# Patient Record
Sex: FEMALE | Race: WHITE | NOT HISPANIC OR LATINO | Employment: OTHER | URBAN - METROPOLITAN AREA
[De-identification: names, ages, dates, MRNs, and addresses within clinical notes are randomized per-mention and may not be internally consistent; named-entity substitution may affect disease eponyms.]

---

## 2020-06-04 ENCOUNTER — OFFICE VISIT (OUTPATIENT)
Dept: GERIATRICS | Age: 80
End: 2020-06-04
Payer: COMMERCIAL

## 2020-06-04 VITALS
OXYGEN SATURATION: 98 % | BODY MASS INDEX: 31.84 KG/M2 | TEMPERATURE: 96.9 F | HEART RATE: 60 BPM | WEIGHT: 162.2 LBS | HEIGHT: 60 IN | DIASTOLIC BLOOD PRESSURE: 72 MMHG | SYSTOLIC BLOOD PRESSURE: 160 MMHG

## 2020-06-04 DIAGNOSIS — F03.90 DEMENTIA WITHOUT BEHAVIORAL DISTURBANCE, UNSPECIFIED DEMENTIA TYPE (HCC): ICD-10-CM

## 2020-06-04 DIAGNOSIS — R35.0 FREQUENT URINATION: ICD-10-CM

## 2020-06-04 DIAGNOSIS — R32 URINARY INCONTINENCE, UNSPECIFIED TYPE: Primary | ICD-10-CM

## 2020-06-04 DIAGNOSIS — R35.0 FREQUENCY OF URINATION: ICD-10-CM

## 2020-06-04 LAB
BACTERIA UR QL AUTO: ABNORMAL /HPF
BILIRUB UR QL STRIP: ABNORMAL
CLARITY UR: CLEAR
COLOR UR: YELLOW
GLUCOSE UR STRIP-MCNC: NEGATIVE MG/DL
HGB UR QL STRIP.AUTO: NEGATIVE
KETONES UR STRIP-MCNC: NEGATIVE MG/DL
LEUKOCYTE ESTERASE UR QL STRIP: ABNORMAL
MUCOUS THREADS UR QL AUTO: ABNORMAL
NITRITE UR QL STRIP: NEGATIVE
NON-SQ EPI CELLS URNS QL MICRO: ABNORMAL /HPF
OTHER STN SPEC: ABNORMAL
PH UR STRIP.AUTO: 6.5 [PH]
PROT UR STRIP-MCNC: ABNORMAL MG/DL
RBC #/AREA URNS AUTO: ABNORMAL /HPF
SL AMB  POCT GLUCOSE, UA: ABNORMAL
SL AMB LEUKOCYTE ESTERASE,UA: 70
SL AMB POCT BILIRUBIN,UA: ABNORMAL
SL AMB POCT BLOOD,UA: ABNORMAL
SL AMB POCT CLARITY,UA: ABNORMAL
SL AMB POCT COLOR,UA: YELLOW
SL AMB POCT KETONES,UA: ABNORMAL
SL AMB POCT NITRITE,UA: ABNORMAL
SL AMB POCT PH,UA: 6
SL AMB POCT SPECIFIC GRAVITY,UA: 1.02
SL AMB POCT URINE PROTEIN: ABNORMAL
SP GR UR STRIP.AUTO: 1.02 (ref 1–1.03)
UROBILINOGEN UR QL STRIP.AUTO: 1 E.U./DL
WBC #/AREA URNS AUTO: ABNORMAL /HPF

## 2020-06-04 PROCEDURE — 81001 URINALYSIS AUTO W/SCOPE: CPT | Performed by: STUDENT IN AN ORGANIZED HEALTH CARE EDUCATION/TRAINING PROGRAM

## 2020-06-04 PROCEDURE — 99204 OFFICE O/P NEW MOD 45 MIN: CPT | Performed by: STUDENT IN AN ORGANIZED HEALTH CARE EDUCATION/TRAINING PROGRAM

## 2020-06-04 PROCEDURE — 81002 URINALYSIS NONAUTO W/O SCOPE: CPT | Performed by: STUDENT IN AN ORGANIZED HEALTH CARE EDUCATION/TRAINING PROGRAM

## 2020-06-04 PROCEDURE — 87086 URINE CULTURE/COLONY COUNT: CPT | Performed by: STUDENT IN AN ORGANIZED HEALTH CARE EDUCATION/TRAINING PROGRAM

## 2020-06-05 PROBLEM — R35.0 FREQUENCY OF URINATION: Status: ACTIVE | Noted: 2020-06-05

## 2020-06-05 PROBLEM — F03.90 DEMENTIA WITHOUT BEHAVIORAL DISTURBANCE (HCC): Status: ACTIVE | Noted: 2020-06-05

## 2020-06-05 LAB — BACTERIA UR CULT: NORMAL

## 2020-06-08 ENCOUNTER — TELEPHONE (OUTPATIENT)
Dept: GERIATRICS | Age: 80
End: 2020-06-08

## 2022-12-30 ENCOUNTER — HOSPITAL ENCOUNTER (EMERGENCY)
Facility: HOSPITAL | Age: 82
Discharge: NURSING, REHAB OR LONG-TERM CARE | End: 2022-12-30
Attending: SURGERY

## 2022-12-30 ENCOUNTER — APPOINTMENT (EMERGENCY)
Dept: CT IMAGING | Facility: HOSPITAL | Age: 82
End: 2022-12-30

## 2022-12-30 VITALS
DIASTOLIC BLOOD PRESSURE: 77 MMHG | BODY MASS INDEX: 38.09 KG/M2 | HEIGHT: 59 IN | WEIGHT: 188.93 LBS | SYSTOLIC BLOOD PRESSURE: 139 MMHG | RESPIRATION RATE: 18 BRPM | OXYGEN SATURATION: 96 % | HEART RATE: 63 BPM | TEMPERATURE: 97.3 F
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
## 2022-12-30 PROBLEM — S00.03XA HEMATOMA OF OCCIPITAL REGION OF SCALP: Status: ACTIVE | Noted: 2022-12-30

## 2022-12-30 LAB
ABO GROUP BLD: NORMAL
BASE EXCESS BLDA CALC-SCNC: 4 MMOL/L (ref -2–3)
BLD GP AB SCN SERPL QL: NEGATIVE
CA-I BLD-SCNC: 1.12 MMOL/L (ref 1.12–1.32)
GLUCOSE SERPL-MCNC: 101 MG/DL (ref 65–140)
HCO3 BLDA-SCNC: 27 MMOL/L (ref 24–30)
HCT VFR BLD CALC: 35 % (ref 34.8–46.1)
HGB BLDA-MCNC: 11.9 G/DL (ref 11.5–15.4)
HOLD SPECIMEN: NORMAL
PCO2 BLD: 28 MMOL/L (ref 21–32)
PCO2 BLD: 34.8 MM HG (ref 42–50)
PH BLD: 7.5 [PH] (ref 7.3–7.4)
PO2 BLD: 28 MM HG (ref 35–45)
POTASSIUM BLD-SCNC: 5.7 MMOL/L (ref 3.5–5.3)
RH BLD: POSITIVE
SAO2 % BLD FROM PO2: 59 % (ref 60–85)
SODIUM BLD-SCNC: 137 MMOL/L (ref 136–145)
SPECIMEN EXPIRATION DATE: NORMAL
SPECIMEN SOURCE: ABNORMAL

## 2022-12-30 RX ORDER — MINERAL OIL 100 G/100G
1 OIL RECTAL ONCE
COMMUNITY

## 2022-12-30 RX ORDER — LEVOTHYROXINE SODIUM 0.1 MG/1
100 TABLET ORAL DAILY
COMMUNITY

## 2022-12-30 RX ORDER — POTASSIUM CHLORIDE 1500 MG/1
40 TABLET, FILM COATED, EXTENDED RELEASE ORAL
COMMUNITY

## 2022-12-30 RX ORDER — CLOPIDOGREL BISULFATE 75 MG/1
75 TABLET ORAL DAILY
COMMUNITY

## 2022-12-30 RX ORDER — FUROSEMIDE 80 MG
80 TABLET ORAL DAILY
COMMUNITY

## 2022-12-30 RX ORDER — CARVEDILOL 3.12 MG/1
3.12 TABLET ORAL 2 TIMES DAILY WITH MEALS
COMMUNITY

## 2022-12-30 RX ORDER — LANOLIN ALCOHOL/MO/W.PET/CERES
CREAM (GRAM) TOPICAL AS NEEDED
COMMUNITY

## 2022-12-30 RX ORDER — BISACODYL 10 MG
10 SUPPOSITORY, RECTAL RECTAL DAILY PRN
COMMUNITY

## 2022-12-30 RX ORDER — SENNA PLUS 8.6 MG/1
1 TABLET ORAL DAILY
COMMUNITY

## 2022-12-30 RX ORDER — ACETAMINOPHEN 325 MG/1
650 TABLET ORAL EVERY 6 HOURS PRN
COMMUNITY

## 2022-12-30 RX ORDER — POLYETHYLENE GLYCOL 3350 17 G/17G
17 POWDER, FOR SOLUTION ORAL DAILY
COMMUNITY

## 2022-12-30 NOTE — ASSESSMENT & PLAN NOTE
- Scalp hematoma with abrasion s/p fall with head strike on Plavix  - Local wound care with soap and water daily  - Tdap 2017

## 2022-12-30 NOTE — ED PROVIDER NOTES
Emergency Department Airway Evaluation and Management Form    History  Obtained from: EMS and patient  Patient has no allergy information on record  No chief complaint on file  HPI  Patient is a 78year old female with dementia who reportedly fell at nursing facility this AM and struck her head  Patient is on plavix  Patient cannot provide accurate hx due to dementia  (+) headache  Airway intact  Pupils pinpoint  TMs clear  Oropharynx clear  ED resident Dr Gabino Jewell evaluated patient's airway  Trauma team evaluating patient in trauma ED  No past medical history on file  No past surgical history on file  No family history on file  I have reviewed and agree with the history as documented  Review of Systems    Physical Exam  There were no vitals taken for this visit      Physical Exam    ED Medications  Medications - No data to display    Intubation  Procedures    Notes      Final Diagnosis  Final diagnoses:   None       ED Provider  Electronically Signed by     Chet Bose MD  12/30/22 0149

## 2022-12-30 NOTE — ED NOTES
Pt to be picked up by [place] at [id number] [address], [address] [place] [address]  12/30/22 4575

## 2022-12-30 NOTE — H&P
[address] 40 1940, 78 y o  female MRN: [id number]  Unit/Bed#: ED-28 Encounter: [id number]  Primary Care Provider: No primary care provider on file  Date and time admitted to hospital: 12/30/2022  4:55 AM    Fall  Assessment & Plan  - Witnessed mechanical fall with the below noted injuries  - Fall precautions   - No injuries on trauma imaging  - Discharge back to [facility] with PCP follow up    * Hematoma of occipital region of scalp  Assessment & Plan  - Scalp hematoma with abrasion s/p fall with head strike on Plavix  - Local wound care with soap and water daily  - Tdap 2017    Cervical Collar Clearance: The patient had a CT scan of the cervical spine demonstrating no acute injury  On exam, the patient had no midline point tenderness or paresthesias/numbness/weakness in the extremities  The patient had full range of motion (was then able to flex, extend, and rotate head laterally) without pain  There were no distracting injuries and the patient was not intoxicated  The patient's cervical spine was cleared radiologically and clinically  Cervical collar removed at this time  Jesus Aguayo PA-C  12/30/2022 6:12 AM       Called patient's son and there was no answer  I left a voicemail stating his mother is at [facility] after a fall and since she has no injuries, she will be discharged back to [facility]  I gave him the ED phone number to call back with any questions  Trauma Alert: Level B   Model of Arrival: Ambulance    Trauma Team: Attending Naveed Lea and MAX [address] [place] [address]  Consultants:     None     History of Present Illness     Chief Complaint: Fall with head strike on Plavix  Mechanism:Fall     HPI:    Butch Morris is a 78 y o  female who presents after a witnessed mechanical fall at her dementia unit at [facility]   Pt is a poor historian due to history of dementia, however EMS reports staff witnessed patient fall in the hallway with posterior head strike, no loss of consciousness, and takes Plavix daily  Pt complains of head pain and right hip pain  Review of Systems   Unable to perform ROS: Dementia     12-point, complete review of systems was reviewed and negative except as stated above  Historical Information     Past Medical History:   Diagnosis Date   • Atherosclerosis of coronary artery of native heart without angina pectoris, unspecified vessel or lesion type    • Dementia ([facility] [address] )      History reviewed  No pertinent surgical history  Unable to obtain history due to Dementia         There is no immunization history on file for this patient  Last Tetanus: 2017  Family History: Non-contributory    1  Before the illness or injury that brought you to the Emergency, did you need someone to help you on a regular basis? 1=Yes   2  Since the illness or injury that brought you to the Emergency, have you needed more help than usual to take care of yourself? 1=Yes   3  Have you been hospitalized for one or more nights during the past 6 months (excluding a stay in the Emergency Department)? 0=No   4  In general, do you see well? 1=No   5  In general, do you have serious problems with your memory? 1=Yes   6  Do you take more than three different medications everyday? 1=Yes   TOTAL   5     Did you order a geriatric consult if the score was 2 or greater?: yes     Meds/Allergies   all current active meds have been reviewed  Allergies have not been reviewed;   No Known Allergies    Objective   Initial Vitals:   Temperature: (!) 97 3 °F (36 3 °C) (12/30/22 0500)  Pulse: 69 (12/30/22 0500)  Respirations: 18 (12/30/22 0500)  Blood Pressure: 153/74 (12/30/22 0500)    Primary Survey:   Airway:        Status: patent;        Pre-hospital Interventions: none        Hospital Interventions: none  Breathing:        Pre-hospital Interventions: none       Effort: normal       Right breath sounds: normal       Left breath sounds: normal  Circulation: Rhythm: regular       Rate: regular   Right Pulses Left Pulses    R radial: 2+    R pedal: 2+     L radial: 2+    L pedal: 2+       Disability:        GCS: Eye: 4; Verbal: 4 Motor: 6 Total: 14       Right Pupil: round;  reactive         Left Pupil:  round;  reactive      R Motor Strength L Motor Strength    R : 5/5  R dorsiflex: 5/5  R plantarflex: 5/5 L : 5/5  L dorsiflex: 5/5  L plantarflex: 5/5        Sensory:  No sensory deficit  Exposure:       Completed: Yes      Secondary Survey:  Physical Exam  Vitals reviewed  Constitutional:       General: She is not in acute distress  Appearance: Normal appearance  HENT:      Head: Normocephalic  Comments: Posterior head hematoma with overlying abrasion     Right Ear: External ear normal       Left Ear: External ear normal       Nose: Nose normal       Mouth/Throat:      Mouth: Mucous membranes are moist       Pharynx: Oropharynx is clear  Eyes:      Extraocular Movements: Extraocular movements intact  Conjunctiva/sclera: Conjunctivae normal       Pupils: Pupils are equal, round, and reactive to light  Cardiovascular:      Rate and Rhythm: Normal rate and regular rhythm  Pulses: Normal pulses  Heart sounds: Normal heart sounds  Pulmonary:      Effort: Pulmonary effort is normal  No respiratory distress  Breath sounds: Normal breath sounds  Chest:      Chest wall: No tenderness  Abdominal:      General: Abdomen is flat  Bowel sounds are normal  There is no distension  Palpations: Abdomen is soft  There is no mass  Tenderness: There is no abdominal tenderness  There is no guarding or rebound  Hernia: No hernia is present  Musculoskeletal:         General: Tenderness present  No swelling, deformity or signs of injury  Normal range of motion  Cervical back: No tenderness  Comments: Right hip tenderness   Skin:     General: Skin is warm and dry        Capillary Refill: Capillary refill takes less than 2 seconds  Findings: No bruising or lesion  Neurological:      General: No focal deficit present  Mental Status: She is alert  Mental status is at baseline  She is disoriented  Sensory: No sensory deficit  Motor: No weakness  Psychiatric:         Mood and Affect: Mood normal          Behavior: Behavior normal          Invasive Devices     Peripheral Intravenous Line  Duration           Peripheral IV 12/30/22 Left Antecubital <1 day              Lab Results:   Results: I have personally reviewed all pertinent laboratory/tests results, BMP/CMP:   Lab Results   Component Value Date    CO2 28 12/30/2022    GLUCOSE 101 12/30/2022    and CBC:   Lab Results   Component Value Date    HGB 11 9 12/30/2022    HCT 35 12/30/2022       Imaging Results: I have personally reviewed pertinent reports  Chest Xray(s): negative for acute findings   FAST exam(s): negative for acute findings   CT Scan(s): see below   Additional Xray(s): pending     Other Studies:   TRAUMA - CT spine cervical wo contrast   Final Result by Ashley Alexis MD (12/30 6947)      No cervical spine fracture or traumatic malalignment  I personally discussed this study with Breann Garcia on 12/30/2022 at 5:35 AM                    Workstation performed: [id number]         TRAUMA - CT head wo contrast   Final Result by Ashley Alexis MD (12/30 1927)      No acute intracranial abnormality  Mild chronic small vessel ischemic changes and volume loss  Workstation performed: [id number]         TRAUMA - CT chest abdomen pelvis wo contrast   Final Result by Ashley Alexis MD (12/30 7521)      Although the study was limited by the lack of intravenous contrast, there is no gross evidence of solid organ injury  No acute intra-abdominal abnormality  No free air or free fluid  No pneumothorax  2 7 cm left hepatic lobe hypodensity likely representing a cyst or hemangioma    A nonemergent hepatic ultrasound is recommended for further characterization  Workstation performed: [id number]         XR Trauma multiple ([facility] trauma bay ONLY)    (Results Pending)   XR chest 1 view    (Results Pending)   XR pelvis ap only 1 or 2 vw    (Results Pending)         Code Status: No Order  Advance Directive and Living Will:      Power of :    POLST:    I have spent 30 minutes with Patient and family today in which greater than 50% of this time was spent in counseling/coordination of care regarding Diagnostic results, Prognosis, Risks and benefits of tx options, Intructions for management, Patient and family education, Importance of tx compliance, Risk factor reductions and Impressions

## 2022-12-30 NOTE — PROCEDURES
POC FAST US    Date/Time: 12/30/2022 5:19 AM  Performed by: Catrachito Che PA-C  Authorized by: Catrachito Che PA-C     Patient location:  Trauma  Procedure details:     Exam Type:  Diagnostic    Indications: blunt abdominal trauma and blunt chest trauma      Assess for:  Intra-abdominal fluid and pericardial effusion    Technique: FAST      Views obtained:  Heart - Pericardial sac, LUQ - Splenorenal space, Suprapubic - Pouch of Griffin and RUQ - Ruiz's Pouch    Image quality: diagnostic      Image availability:  Images available in PACS and video obtained  FAST Findings:     RUQ (Hepatorenal) free fluid: absent      LUQ (Splenorenal) free fluid: absent      Suprapubic free fluid: absent      Cardiac wall motion: identified      Pericardial effusion: absent    Interpretation:     Impressions: negative

## 2022-12-30 NOTE — ED NOTES
Pt ambulated with nursing to bathroom  Pt moved to room closer to nurses till [address] can pick her up        Gregory Vasquez RN  12/30/22 7196

## 2022-12-30 NOTE — INCIDENTAL FINDINGS
The following findings require follow up:  Radiographic finding   Findin 7 cm left hepatic lobe hypodensity likely representing a cyst or hemangioma  A nonemergent hepatic ultrasound is recommended for further characterization  Follow up should be done routinely  Attempted to call patient's son to discuss incidental findings and discharge from ED, but there was no answer       Please notify the following clinician to assist with the follow up:   Dr Junior Numbers

## 2022-12-30 NOTE — ASSESSMENT & PLAN NOTE
- Witnessed mechanical fall with the below noted injuries    - Fall precautions   - No injuries on trauma imaging  - Discharge back to [facility] with PCP follow up